# Patient Record
Sex: FEMALE | Race: WHITE
[De-identification: names, ages, dates, MRNs, and addresses within clinical notes are randomized per-mention and may not be internally consistent; named-entity substitution may affect disease eponyms.]

---

## 2017-08-04 ENCOUNTER — HOSPITAL ENCOUNTER (OUTPATIENT)
Dept: HOSPITAL 80 - CIMAGING | Age: 42
End: 2017-08-04
Attending: FAMILY MEDICINE
Payer: COMMERCIAL

## 2017-08-04 DIAGNOSIS — Z12.31: Primary | ICD-10-CM

## 2017-08-04 PROCEDURE — G0202 SCR MAMMO BI INCL CAD: HCPCS

## 2017-08-11 ENCOUNTER — HOSPITAL ENCOUNTER (OUTPATIENT)
Dept: HOSPITAL 80 - FIMAGING | Age: 42
End: 2017-08-11
Attending: FAMILY MEDICINE
Payer: COMMERCIAL

## 2017-08-11 DIAGNOSIS — R92.8: Primary | ICD-10-CM

## 2017-08-11 PROCEDURE — G0206 DX MAMMO INCL CAD UNI: HCPCS

## 2017-11-10 ENCOUNTER — HOSPITAL ENCOUNTER (OUTPATIENT)
Dept: HOSPITAL 80 - CIMAGING | Age: 42
End: 2017-11-10
Attending: PHYSICAL MEDICINE & REHABILITATION
Payer: COMMERCIAL

## 2017-11-10 DIAGNOSIS — M25.521: ICD-10-CM

## 2017-11-10 DIAGNOSIS — M79.631: Primary | ICD-10-CM

## 2017-12-19 ENCOUNTER — HOSPITAL ENCOUNTER (EMERGENCY)
Dept: HOSPITAL 80 - FED | Age: 42
Discharge: HOME | End: 2017-12-19
Payer: COMMERCIAL

## 2017-12-19 VITALS — SYSTOLIC BLOOD PRESSURE: 128 MMHG | TEMPERATURE: 97.7 F | DIASTOLIC BLOOD PRESSURE: 86 MMHG | HEART RATE: 66 BPM

## 2017-12-19 VITALS — OXYGEN SATURATION: 95 %

## 2017-12-19 VITALS — RESPIRATION RATE: 16 BRPM

## 2017-12-19 DIAGNOSIS — R07.89: Primary | ICD-10-CM

## 2017-12-19 LAB
% IMMATURE GRANULYOCYTES: 0.1 % (ref 0–1.1)
ABSOLUTE IMMATURE GRANULOCYTES: 0.01 10^3/UL (ref 0–0.1)
ABSOLUTE NRBC COUNT: 0 10^3/UL (ref 0–0.01)
ADD DIFF?: NO
ADD MORPH?: NO
ADD SCAN?: NO
ANION GAP SERPL CALC-SCNC: 8 MEQ/L (ref 8–16)
ATYPICAL LYMPHOCYTE FLAG: 0 (ref 0–99)
CALCIUM SERPL-MCNC: 9.8 MG/DL (ref 8.5–10.4)
CHLORIDE SERPL-SCNC: 104 MEQ/L (ref 97–110)
CO2 SERPL-SCNC: 27 MEQ/L (ref 22–31)
CREAT SERPL-MCNC: 0.8 MG/DL (ref 0.6–1)
ERYTHROCYTE [DISTWIDTH] IN BLOOD BY AUTOMATED COUNT: 12.4 % (ref 11.5–15.2)
FRAGMENT RBC FLAG: 0 (ref 0–99)
GFR SERPL CREATININE-BSD FRML MDRD: > 60 ML/MIN/{1.73_M2}
GLUCOSE SERPL-MCNC: 93 MG/DL (ref 70–100)
HCT VFR BLD CALC: 40 % (ref 38–47)
HGB BLD-MCNC: 13.5 G/DL (ref 12.6–16.3)
LEFT SHIFT FLG: 0 (ref 0–99)
LIPEMIA HEMOLYSIS FLAG: 90 (ref 0–99)
MCH RBC BLDCO QN: 31.5 PG (ref 27.9–34.1)
MCHC RBC AUTO-ENTMCNC: 33.8 G/DL (ref 32.4–36.7)
MCV RBC AUTO: 93.5 FL (ref 81.5–99.8)
NRBC-AUTO%: 0 % (ref 0–0.2)
PLATELET # BLD: 187 10^3/UL (ref 150–400)
PLATELET CLUMPS FLAG: 10 (ref 0–99)
PMV BLD AUTO: 12.3 FL (ref 8.7–11.7)
POTASSIUM SERPL-SCNC: 4.6 MEQ/L (ref 3.5–5.2)
RBC # BLD AUTO: 4.28 10^6/UL (ref 4.18–5.33)
SODIUM SERPL-SCNC: 139 MEQ/L (ref 134–144)
TROPONIN I SERPL-MCNC: < 0.012 NG/ML (ref 0–0.03)

## 2017-12-19 NOTE — EDPHY
H & P


Stated Complaint: cp





- Personal History


LMP (Females 10-55): 8-14 Days Ago


Current Tetanus Diphtheria and Acellular Pertussis (TDAP): Yes





- Medical/Surgical History


Other PMH: ruptured liver,guerita





- Social History


Smoking Status: Never smoked


Time Seen by Provider: 12/19/17 18:52


Constitutional: 


 Initial Vital Signs











Temperature (C)  36.6 C   12/19/17 18:35


 


Heart Rate  60   12/19/17 18:35


 


Respiratory Rate  16   12/19/17 18:35


 


Blood Pressure  159/85 H  12/19/17 18:35


 


O2 Sat (%)  94   12/19/17 18:35








 











O2 Delivery Mode               Room Air














Allergies/Adverse Reactions: 


 





No Known Allergies Allergy (Unverified 12/19/17 18:35)


 








Home Medications: 














 Medication  Instructions  Recorded


 


NK [No Known Home Meds]  12/19/17














Medical Decision Making





- Diagnostics


Imaging: Discussed imaging studies w/ On call Radiologist, I viewed and 

interpreted images myself





- Diagnostics


Imaging Results: 


 Imaging Impressions





Chest X-Ray  12/19/17 19:36


Impression: Negative except for mild airways disease.











ED Course/Re-evaluation: 





CHIEF COMPLAINT:  Chest pain





HISTORY OF PRESENT ILLNESS:  The patient is a 41 y/o female complaining of 

intermittent chest pain and left arm tingling onset about 4 hours ago. She has 

a history of acid reflux and states her symptoms today feel different. She 

describes her pain as "stabbing" in quality and worse with palpation. She 

denies associated dyspnea, nausea, vomiting, lightheadedness, fever, recent 

illness, or recent trauma. She had an EBCT heart scan 3 years ago that showed a 

calcium score of zero. No personal or family history of cardiac disease, 

respiratory disease, diabetes, hypercholesterolemia, or hypertension. 





REVIEW OF SYSTEMS:  





A 10 point review of systems was performed and is negative with the exception 

of the elements mentioned in the history of present illness.





PHYSICAL EXAM:  





HR, BP, O2 Sat, RR.  Temp noted


General Appearance:  Alert, well hydrated, appropriate, and non-toxic appearing.


Head:  Atraumatic without scalp tenderness or obvious injury


Eyes:  Pupils equal, round, reactive to light and accommodation, EOMI, no trauma

, no injection.


Nose:  Atraumatic, no rhinorrhea, clear.


Throat:  There is no erythema or exudates, no lesions, normal tonsils, mucus 

membranes moist.


Neck:  Supple, nontender, no lymphadenopathy.


Respiratory:  No retractions, no distress, no wheezes, and no accessory muscle 

use.  Lungs are clear to auscultation bilaterally.


Cardiovascular:  Regular rate and rhythm, no murmurs, rubs, or gallops. Good 

capillary refill all extremities.


Chest: Mild tenderness below left clavicle to palpation.


Gastrointestinal:  Abdomen is soft, nontender, non-distended, no masses, no 

rebound, no guarding, no peritoneal signs.


Musculoskeletal:  Normal active ROM of all extremities, atraumatic.


Neurological:  Alert, appropriate, and interactive.  The patient has non-focal 

cranial nerves, motor, sensory, and cerebellar exam.


Skin:  No rashes, good turgor, no nodules on palpation.





Past medical history: Bradycardia, acid reflux


Past surgical history: Noncontributory


Family history: No cardiac disease history


Social history: Works at [employer]. Nonsmoker. 





Reviewed prior medical records including EBCT from 2014 and lipid panel 2015. 





DIAGNOSTICS/PROCEDURES/CRITICAL CARE TIME:  





The 12 lead EKG was interpreted by myself. Sinus mechanism. See hard copy and/

or "tracemaster" electronic copy for interpretation.





Chest x-ray: negative





DIFFERENTIAL DIAGNOSIS:   The differential diagnosis for the patient's chest 

pain included but was not limited to myocardial ischemia, pulmonary embolus, 

chest wall pain, pleural inflammation, and pulmonary infectious causes.





MEDICAL DECISION MAKING:  





This is a 41 y/o female with a history of acid reflux and bradycardia who 

presents with a 4-hour history of intermittent left-sided chest pain. Her pain 

is reproducible to palpation underneath her left clavicle. Due to a calcium 

score of 0 on EBCT in 2014 and great lipid panel in 2015, using the VIDALES 10-

year CHD Risk with Coronary Artery Calcification her 10-year estimated risk for 

a CHD event is less than 0.9%. Plan for IV, labs, EKG, chest x-ray. 





Chest x-ray, EKG, and labs are unremarkable with exception of elevated d-dimer. 

Chest CTA ordered to rule out PE. (Yoni Calhoun)


Other Provider: 





CT angiography of the chest is normal per Dr. Melara at 9:11 p.m..


Then as per Dr. Calhoun instructions patient is discharged, diagnosis of chest 

pain.


CT results discussed with the patient. (Olivas,Rachid S)





- Data Points


Laboratory Results: 


 Laboratory Results





 12/19/17 18:50 





 12/19/17 18:50 





 











  12/19/17 12/19/17 12/19/17





  18:50 18:50 18:50


 


WBC      7.40 10^3/uL 10^3/uL





     (3.80-9.50) 


 


RBC      4.28 10^6/uL 10^6/uL





     (4.18-5.33) 


 


Hgb      13.5 g/dL g/dL





     (12.6-16.3) 


 


Hct      40.0 % %





     (38.0-47.0) 


 


MCV      93.5 fL fL





     (81.5-99.8) 


 


MCH      31.5 pg pg





     (27.9-34.1) 


 


MCHC      33.8 g/dL g/dL





     (32.4-36.7) 


 


RDW      12.4 % %





     (11.5-15.2) 


 


Plt Count      187 10^3/uL 10^3/uL





     (150-400) 


 


MPV      12.3 fL H fL





     (8.7-11.7) 


 


Neut % (Auto)      63.7 % %





     (39.3-74.2) 


 


Lymph % (Auto)      27.8 % %





     (15.0-45.0) 


 


Mono % (Auto)      5.5 % %





     (4.5-13.0) 


 


Eos % (Auto)      2.2 % %





     (0.6-7.6) 


 


Baso % (Auto)      0.7 % %





     (0.3-1.7) 


 


Nucleat RBC Rel Count      0.0 % %





     (0.0-0.2) 


 


Absolute Neuts (auto)      4.71 10^3/uL 10^3/uL





     (1.70-6.50) 


 


Absolute Lymphs (auto)      2.06 10^3/uL 10^3/uL





     (1.00-3.00) 


 


Absolute Monos (auto)      0.41 10^3/uL 10^3/uL





     (0.30-0.80) 


 


Absolute Eos (auto)      0.16 10^3/uL 10^3/uL





     (0.03-0.40) 


 


Absolute Basos (auto)      0.05 10^3/uL 10^3/uL





     (0.02-0.10) 


 


Absolute Nucleated RBC      0.00 10^3/uL 10^3/uL





     (0-0.01) 


 


Immature Gran %      0.1 % %





     (0.0-1.1) 


 


Immature Gran #      0.01 10^3/uL 10^3/uL





     (0.00-0.10) 


 


D-Dimer  0.74 ug/mLFEU H ug/mLFEU    





   (0.00-0.50)   


 


Sodium    139 mEq/L mEq/L  





    (134-144)  


 


Potassium    4.6 mEq/L mEq/L  





    (3.5-5.2)  


 


Chloride    104 mEq/L mEq/L  





    ()  


 


Carbon Dioxide    27 mEq/l mEq/l  





    (22-31)  


 


Anion Gap    8 mEq/L mEq/L  





    (8-16)  


 


BUN    16 mg/dL mg/dL  





    (7-23)  


 


Creatinine    0.8 mg/dL mg/dL  





    (0.6-1.0)  


 


Estimated GFR    > 60   





    


 


Glucose    93 mg/dL mg/dL  





    ()  


 


Calcium    9.8 mg/dL mg/dL  





    (8.5-10.4)  


 


Troponin I    < 0.012 ng/mL ng/mL  





    (0.000-0.034)  














Departure





- Departure


Disposition: Home, Routine, Self-Care


Clinical Impression: 


Chest pain


Qualifiers:


 Chest pain type: other chest pain Qualified Code(s): R07.89 - Other chest pain





Condition: Good


Instructions:  Chest Pain (ED)


Referrals: 


Tiffanie Hernandez MD [Primary Care Provider] - As per Instructions


Report Scribed for: Yoni Calhoun


Report Scribed by: Stephanie Grant


Date of Report: 12/19/17


Time of Report: 18:56

## 2017-12-19 NOTE — CPEKG
Heart Rate: 51

RR Interval: 1176

P-R Interval: 168

QRSD Interval: 104

QT Interval: 416

QTC Interval: 384

P Axis: 38

QRS Axis: -22

T Wave Axis: -7

EKG Severity - ABNORMAL ECG -

EKG Impression: SINUS ARRHYTHMIA, RATE 44-59

EKG Impression: BORDERLINE LEFT AXIS DEVIATION

EKG Impression: NONSPECIFIC T ABNORMALITIES, INFERIOR LEADS

Electronically Signed By: Yoni Calhoun 19-Dec-2017 19:48:46

## 2018-06-13 ENCOUNTER — HOSPITAL ENCOUNTER (OUTPATIENT)
Dept: HOSPITAL 80 - CIMAGING | Age: 43
End: 2018-06-13
Attending: PHYSICAL MEDICINE & REHABILITATION
Payer: COMMERCIAL

## 2018-06-13 DIAGNOSIS — M25.761: Primary | ICD-10-CM

## 2018-09-21 ENCOUNTER — HOSPITAL ENCOUNTER (OUTPATIENT)
Dept: HOSPITAL 80 - CIMAGING | Age: 43
End: 2018-09-21
Attending: FAMILY MEDICINE
Payer: COMMERCIAL

## 2018-09-21 DIAGNOSIS — Z12.31: Primary | ICD-10-CM
